# Patient Record
(demographics unavailable — no encounter records)

---

## 2024-12-20 NOTE — ASSESSMENT
[FreeTextEntry1] : 35M here for right kidney stone  #Right Kidney stone -Pt to start on Tamsulosin in expectation of stone moving while pt obtains new CT [facility] in the next week. Reiterated risks of fever if stone and obstruction and occurrence of infection, about potential for pain during passage, and about surgical intervention (typically URS or ESWL). Pt instructed to take OTC Tylenol 1000mg every 8 hrs and alternate with 400mg of Ibuprofen if needed. ER px reviewed- pt to return to ER for uncontrollable pain, fevers greater than 100.4F, or unable to tolerate food by mouth.     Pt comfortable with repeat imaging and possibility of passage, with fallback position of intervention if indicated. Pt will strain urine and if stone passes to drop it off for stone analysis. Pt to continue with proper hydration and avoid constipation. Pt reports previous renal stones- discussed need for workup after pt is considered stone free with a   complete metabolic work-up with behavioral and dietary modification. In recurrent stone formers, the risk of recurrence is considerably higher with a lifetime recurrence rate of 60-80%. Risk factors include stone type, total stone volume, family history, multiple stones, and many medical comorbidities (DM, HTN, HLD, obesity, gout, HPT).  -Pt scheduled for CT [facility] in 1 week- will call with results and discussion for next steps.   -UA/UCx sent- will call with results.

## 2024-12-20 NOTE — HISTORY OF PRESENT ILLNESS
[Urinary Urgency] : urinary urgency [Urinary Frequency] : urinary frequency [2] : 2 [FreeTextEntry1] : : May 25 1989 Referring Provider: none   HPI:  Mr. MIAH MENG is a 35 year M with a PMHx notable for kidney stones- reports was seen in [facility] about 6-8 weeks ago for right flank pain- was given Cipro, Oxycodone and Tamsulosin for 4mm right ureteral stone diagnosed with CT A/P (pt with no disc or report on hand) -reports having taken Tamsulosin for a few days only after visit, took Cipro and finished full course- pt reports prior hx of stones in [place]- remembers passing stones with lots of pain and some blood - he still feels the pain is there and sometimes on the left side- does not think he passed the stone- will order new CT [facility] (reviewed ULS done in [facility] ED which still showed hydronephrosis indicating that stones in ureter may be still present) - will do UA/UCx and send more Flomax for pt. Pt currently denies any n/v/d/f/c. Discussed in detail the need to take Flomax and the need for re-imaging with CT [facility] and possible need for URS if stone has not passed. Pt provided with URS handout.     Anticoagulation: None All: NKDA PMHx: kidney stones  PSHx: No kidney surgery  FHx: No family hx of stones SocHx: Never smoker; social ETOH Clearance:   [id number] IMPRESSION: Moderate right-sided hydroureteronephrosis without visualization of obstructing calculus. No right-sided jet is visualized in the bladder. If symptomatology persists, CT abdomen and pelvis might be considered for additional evaluation to exclude a ureteral calculus.   [Urinary Incontinence] : no urinary incontinence [Urinary Retention] : no urinary retention [Nocturia] : no nocturia [Straining] : no straining [Weak Stream] : no weak stream [Intermittency] : no intermittency [Post-Void Dribbling] : no post-void dribbling [Erectile Dysfunction] : no Erectile Dysfunction [Dysuria] : no dysuria [Hematuria - Gross] : no gross hematuria